# Patient Record
Sex: FEMALE | Race: WHITE | NOT HISPANIC OR LATINO | Employment: FULL TIME | URBAN - METROPOLITAN AREA
[De-identification: names, ages, dates, MRNs, and addresses within clinical notes are randomized per-mention and may not be internally consistent; named-entity substitution may affect disease eponyms.]

---

## 2018-01-07 ENCOUNTER — HOSPITAL ENCOUNTER (EMERGENCY)
Facility: OTHER | Age: 33
Discharge: HOME | End: 2018-01-07
Payer: MEDICAID

## 2018-01-07 VITALS
DIASTOLIC BLOOD PRESSURE: 81 MMHG | OXYGEN SATURATION: 100 % | RESPIRATION RATE: 18 BRPM | HEIGHT: 66 IN | TEMPERATURE: 98 F | BODY MASS INDEX: 25.71 KG/M2 | HEART RATE: 89 BPM | SYSTOLIC BLOOD PRESSURE: 135 MMHG | WEIGHT: 160 LBS

## 2018-01-07 DIAGNOSIS — Z48.02 VISIT FOR SUTURE REMOVAL: Primary | ICD-10-CM

## 2018-01-07 PROCEDURE — 99281 EMR DPT VST MAYX REQ PHY/QHP: CPT

## 2018-01-07 NOTE — ED PROVIDER NOTES
Encounter Date: 1/7/2018       History     Chief Complaint   Patient presents with    Suture / Staple Removal     suture removal to L arm x 14 day ago, pt states that she attempted to take out herself and that she is going to need to be numbed up to get the rest of the sutures removed     The history is provided by the patient.   Suture / Staple Removal    The sutures were placed 11 to 14 days ago. There has been no treatment since the wound repair. There has been no drainage from the wound. There is no redness present. There is no swelling present. The pain has improved. She has no difficulty moving the affected extremity or digit.     Review of patient's allergies indicates:   Allergen Reactions    Pcn [penicillins]      History reviewed. No pertinent past medical history.  Past Surgical History:   Procedure Laterality Date    TUBAL LIGATION       No family history on file.  Social History   Substance Use Topics    Smoking status: Never Smoker    Smokeless tobacco: Not on file    Alcohol use Yes     Review of Systems   Constitutional: Negative.    HENT: Negative.    Eyes: Negative.    Respiratory: Negative.    Cardiovascular: Negative.    Gastrointestinal: Negative.    Endocrine: Negative.    Genitourinary: Negative.    Musculoskeletal: Negative.    Skin: Negative.    Allergic/Immunologic: Negative.    Neurological: Negative.    Hematological: Negative.    Psychiatric/Behavioral: Negative.    All other systems reviewed and are negative.      Physical Exam     Initial Vitals [01/07/18 0023]   BP Pulse Resp Temp SpO2   130/80 90 18 98.2 °F (36.8 °C) 100 %      MAP       96.67         Physical Exam    Nursing note and vitals reviewed.  Constitutional: Vital signs are normal. She appears well-developed. She is active and cooperative.   HENT:   Head: Normocephalic and atraumatic.   Eyes: Conjunctivae, EOM and lids are normal. Pupils are equal, round, and reactive to light.   Neck: Trachea normal and full passive  range of motion without pain. Neck supple. No thyroid mass present.   Cardiovascular: Normal rate, regular rhythm, S1 normal, S2 normal, normal heart sounds, intact distal pulses and normal pulses.   Abdominal: Soft. Normal appearance, normal aorta and bowel sounds are normal.   Musculoskeletal: Normal range of motion.        Arms:  Lymphadenopathy:     She has no axillary adenopathy.   Neurological: She is alert and oriented to person, place, and time.   Skin: Skin is warm, dry and intact.   Psychiatric: She has a normal mood and affect. Her speech is normal and behavior is normal. Judgment and thought content normal. Cognition and memory are normal.         ED Course   Suture Removal  Date/Time: 1/7/2018 1:30 AM  Location procedure was performed: [facility] EMERGENCY DEPARTMENT  Performed by: SHAKILA TORRES.  Authorized by: SHAKILA TORRES   Pre-operative diagnosis: laceration  Post-operative diagnosis: laceration  Body area: upper extremity  Location details: left lower arm  Wound Appearance: clean and tender  Sutures Removed: 3  Post-removal: dressing applied  Complications: No  Specimens: No  Implants: No  Patient tolerance: Patient tolerated the procedure well with no immediate complications        Labs Reviewed - No data to display                            ED Course      Clinical Impression:   The encounter diagnosis was Visit for suture removal.                           Shakila Torres MD  01/07/18 0131

## 2018-01-07 NOTE — ED NOTES
"Pt presents to ED with 3 suture in place to L arm, pt states that the suture were place x 14 days ago a [facility] and that she had attempted to remove sutures herself but was unable to remove the remaining 3 due to pain, pt requested that we "numb" her are to remove sutures. No redness, bleeding or s/s of infection noted, wound edges well approximated, sutures removed from arm, pt states that she feels that the sutures were still in place, however, no suture were visualized at wound site, pt instructed to follow up with PCP for further evaluation.   "

## 2018-01-11 ENCOUNTER — HOSPITAL ENCOUNTER (EMERGENCY)
Facility: OTHER | Age: 33
Discharge: HOME | End: 2018-01-11
Attending: EMERGENCY MEDICINE
Payer: MEDICAID

## 2018-01-11 VITALS
BODY MASS INDEX: 25.71 KG/M2 | HEIGHT: 66 IN | OXYGEN SATURATION: 98 % | DIASTOLIC BLOOD PRESSURE: 85 MMHG | WEIGHT: 160 LBS | RESPIRATION RATE: 18 BRPM | SYSTOLIC BLOOD PRESSURE: 140 MMHG | HEART RATE: 100 BPM

## 2018-01-11 DIAGNOSIS — Z51.89 VISIT FOR WOUND CHECK: Primary | ICD-10-CM

## 2018-01-11 PROCEDURE — 99281 EMR DPT VST MAYX REQ PHY/QHP: CPT

## 2018-01-11 NOTE — ED PROVIDER NOTES
"Encounter Date: 1/11/2018       History     Chief Complaint   Patient presents with    Suture / Staple Removal     Pt. reports she was seen Saturday to have her sutures removed and she feels they still have 3 sutures left in her arm that are "deep."     Chief complaint: Wound check  32-year-old presents secondary to possible retained suture.  Patient had "19" stitches placed at [facility] on December 23.  She took out 16 of the stitches herself.  She came here on January 7 to hane  3 "anchoring" sutures removed.  Patient says that she thinks that part of one of the stitches is still in her arm.  She says that she can feel this stitch amd that the area is tight and looks different from the other sites of the anchoring suture removal.  Patient has occasional pain to the arm.  She said she was told that she cut through muscle and some nerve endings.      The history is provided by the patient, a significant other and medical records.     Review of patient's allergies indicates:   Allergen Reactions    Pcn [penicillins]      History reviewed. No pertinent past medical history.  Past Surgical History:   Procedure Laterality Date    laminectomy      TUBAL LIGATION       History reviewed. No pertinent family history.  Social History   Substance Use Topics    Smoking status: Former Smoker     Quit date: 4/11/2016    Smokeless tobacco: Never Used    Alcohol use Yes     Review of Systems   Constitutional: Negative for fever.   Skin: Positive for wound.   Neurological: Positive for numbness (occasional ). Negative for weakness.       Physical Exam     Initial Vitals [01/11/18 1219]   BP Pulse Resp Temp SpO2   (!) 140/85 100 16 -- 100 %      MAP       103.33         Physical Exam    Nursing note and vitals reviewed.  Constitutional: She appears well-nourished.   HENT:   Head: Atraumatic.   Musculoskeletal: Normal range of motion.   Neurological: She is alert and oriented to person, place, and time. She has " normal strength. No sensory deficit.   Skin: Skin is warm and dry.   See pictures, no warmth, erythema or purulent drainage from incision, non tender , no visible retained FB on exam              ED Course   Procedures  Labs Reviewed - No data to display          Medical Decision Making:   Initial Assessment:   32-year-old presents for evaluation of possible retained suture to her arm.  See attached pictures.  ED Management:  I cannot visualize any retained sutures.  However, the  patient feels the suture  underneath her skin.  I examined the wound closely and am unable to grasp/visualize any suture material.  I did explain to the patient that I thought it would be more harmful than good to explore the site since the skin is closed.  There is no evidence of infection.  Dr. Bosch was contacted (Gen. surgery) and he agrees to evaluate the patient in his office.  Patient understands that seeing the surgeon is the appropriate place for evaluating a soft tissue foreign body to avoid further damage to the site.                      pictures 1,2: prior to suture removal on 1/7  Picture 3: after sutures placed on 1/23            ED Course      Clinical Impression:   The encounter diagnosis was Visit for wound check.                           Augustina Lopez MD  01/11/18 4194

## 2018-01-11 NOTE — ED NOTES
Pt presents to ED with c/o suture removal. Pt reports sutures being placed at another facility and feeling like there is one suture left in her left forearm.

## 2020-08-01 ENCOUNTER — HOSPITAL ENCOUNTER (EMERGENCY)
Facility: HOSPITAL | Age: 35
Discharge: HOME | End: 2020-08-01
Attending: EMERGENCY MEDICINE
Payer: MEDICAID

## 2020-08-01 VITALS
HEART RATE: 75 BPM | HEIGHT: 62 IN | SYSTOLIC BLOOD PRESSURE: 141 MMHG | OXYGEN SATURATION: 100 % | BODY MASS INDEX: 28.52 KG/M2 | DIASTOLIC BLOOD PRESSURE: 89 MMHG | RESPIRATION RATE: 16 BRPM | WEIGHT: 155 LBS | TEMPERATURE: 98 F

## 2020-08-01 DIAGNOSIS — S05.01XA ABRASION OF RIGHT CORNEA, INITIAL ENCOUNTER: Primary | ICD-10-CM

## 2020-08-01 LAB
B-HCG UR QL: NEGATIVE
CTP QC/QA: YES

## 2020-08-01 PROCEDURE — 99284 EMERGENCY DEPT VISIT MOD MDM: CPT | Mod: 25,ER

## 2020-08-01 PROCEDURE — 90715 TDAP VACCINE 7 YRS/> IM: CPT | Mod: SL,ER | Performed by: EMERGENCY MEDICINE

## 2020-08-01 PROCEDURE — 90471 IMMUNIZATION ADMIN: CPT | Mod: VFC,ER | Performed by: EMERGENCY MEDICINE

## 2020-08-01 PROCEDURE — 81025 URINE PREGNANCY TEST: CPT | Mod: ER | Performed by: EMERGENCY MEDICINE

## 2020-08-01 PROCEDURE — 25000003 PHARM REV CODE 250: Mod: ER | Performed by: EMERGENCY MEDICINE

## 2020-08-01 PROCEDURE — 63600175 PHARM REV CODE 636 W HCPCS: Mod: SL,ER | Performed by: EMERGENCY MEDICINE

## 2020-08-01 RX ORDER — PROPARACAINE HYDROCHLORIDE 5 MG/ML
1 SOLUTION/ DROPS OPHTHALMIC
Status: COMPLETED | OUTPATIENT
Start: 2020-08-01 | End: 2020-08-01

## 2020-08-01 RX ORDER — ACETAMINOPHEN 500 MG
500 TABLET ORAL EVERY 6 HOURS PRN
Qty: 30 TABLET | Refills: 0 | Status: SHIPPED | OUTPATIENT
Start: 2020-08-01

## 2020-08-01 RX ORDER — IBUPROFEN 600 MG/1
600 TABLET ORAL EVERY 6 HOURS PRN
Qty: 20 TABLET | Refills: 0 | Status: SHIPPED | OUTPATIENT
Start: 2020-08-01

## 2020-08-01 RX ORDER — OXYCODONE AND ACETAMINOPHEN 5; 325 MG/1; MG/1
1 TABLET ORAL EVERY 8 HOURS PRN
Qty: 6 TABLET | Refills: 0 | Status: SHIPPED | OUTPATIENT
Start: 2020-08-01

## 2020-08-01 RX ORDER — MOXIFLOXACIN 5 MG/ML
1 SOLUTION/ DROPS OPHTHALMIC 3 TIMES DAILY
Qty: 3 ML | Refills: 0 | Status: SHIPPED | OUTPATIENT
Start: 2020-08-01 | End: 2020-08-08

## 2020-08-01 RX ORDER — MOXIFLOXACIN 5 MG/ML
1 SOLUTION/ DROPS OPHTHALMIC
Status: COMPLETED | OUTPATIENT
Start: 2020-08-01 | End: 2020-08-01

## 2020-08-01 RX ORDER — ONDANSETRON 8 MG/1
8 TABLET, ORALLY DISINTEGRATING ORAL EVERY 6 HOURS PRN
Qty: 10 TABLET | Refills: 0 | Status: SHIPPED | OUTPATIENT
Start: 2020-08-01 | End: 2020-08-03

## 2020-08-01 RX ADMIN — FLUORESCEIN SODIUM 1 EACH: 1 STRIP OPHTHALMIC at 10:08

## 2020-08-01 RX ADMIN — CLOSTRIDIUM TETANI TOXOID ANTIGEN (FORMALDEHYDE INACTIVATED), CORYNEBACTERIUM DIPHTHERIAE TOXOID ANTIGEN (FORMALDEHYDE INACTIVATED), BORDETELLA PERTUSSIS TOXOID ANTIGEN (GLUTARALDEHYDE INACTIVATED), BORDETELLA PERTUSSIS FILAMENTOUS HEMAGGLUTININ ANTIGEN (FORMALDEHYDE INACTIVATED), BORDETELLA PERTUSSIS PERTACTIN ANTIGEN, AND BORDETELLA PERTUSSIS FIMBRIAE 2/3 ANTIGEN 0.5 ML: 5; 2; 2.5; 5; 3; 5 INJECTION, SUSPENSION INTRAMUSCULAR at 11:08

## 2020-08-01 RX ADMIN — PROPARACAINE HYDROCHLORIDE 1 DROP: 5 SOLUTION/ DROPS OPHTHALMIC at 10:08

## 2020-08-01 RX ADMIN — MOXIFLOXACIN 1 DROP: 5 SOLUTION/ DROPS OPHTHALMIC at 11:08

## 2020-08-01 NOTE — ED PROVIDER NOTES
Encounter Date: 2020    SCRIBE #1 NOTE: I, Jeanine Bueno, am scribing for, and in the presence of,  Dr. House. I have scribed the following portions of the note - Other sections scribed: HPI, ROS, PE.       History     Chief Complaint   Patient presents with    Eye Problem     R Eye pain since yesterday afternoon, states diffifult to open her eye. Wears contacts, they are removed.     Elise Coronel is a 35 y.o. female who presents to the ED complaining of right eye pain since yesterday afternoon. Patient reports that the pain worsened after she took out her contacts later that night. Denies injury to eye, or any known foreign body. Patient unsure if tetanus is UTD.    The history is provided by the patient. No  was used.     Review of patient's allergies indicates:   Allergen Reactions    Pcn [penicillins]      No past medical history on file.  Past Surgical History:   Procedure Laterality Date    laminectomy      TUBAL LIGATION       No family history on file.  Social History     Tobacco Use    Smoking status: Former Smoker     Quit date: 2016     Years since quittin.3    Smokeless tobacco: Never Used   Substance Use Topics    Alcohol use: Yes    Drug use: No     Review of Systems   Eyes: Positive for pain.   All other systems reviewed and are negative.      Physical Exam     Initial Vitals [20 0906]   BP Pulse Resp Temp SpO2   (!) 141/93 85 16 98.2 °F (36.8 °C) 98 %      MAP       --         Physical Exam    Nursing note and vitals reviewed.  Constitutional: She appears well-developed and well-nourished.   HENT:   Head: Normocephalic and atraumatic.   Right Ear: External ear normal.   Left Ear: External ear normal.   Nose: Nose normal.   Mouth/Throat: Oropharynx is clear and moist.   Eyes: Conjunctivae and EOM are normal. Pupils are equal, round, and reactive to light.   Slit lamp exam:       The right eye shows corneal abrasion.   Right eye corneal abrasion, 4 o'clock to  7 o'clock.   Neck: Normal range of motion and phonation normal. Neck supple.   Cardiovascular: Normal rate, regular rhythm, normal heart sounds and intact distal pulses. Exam reveals no gallop and no friction rub.    No murmur heard.  Pulmonary/Chest: Effort normal and breath sounds normal. No stridor. No respiratory distress. She has no wheezes. She has no rhonchi. She has no rales. She exhibits no tenderness.   Abdominal: Soft. Bowel sounds are normal. She exhibits no distension. There is no abdominal tenderness. There is no rigidity, no rebound and no guarding.   Musculoskeletal: Normal range of motion. No tenderness or edema.   Neurological: She is alert and oriented to person, place, and time. She has normal strength. No cranial nerve deficit or sensory deficit. GCS score is 15. GCS eye subscore is 4. GCS verbal subscore is 5. GCS motor subscore is 6.   Skin: Skin is warm and dry. Capillary refill takes less than 2 seconds. No rash noted.   Psychiatric: She has a normal mood and affect. Her behavior is normal.         ED Course   Procedures  Labs Reviewed   POCT URINE PREGNANCY               Medical Decision Making:   History:   Old Medical Records: I decided to obtain old medical records.  Clinical Tests:   Lab Tests: Ordered and Reviewed    Chief complaint: eye pain  Differential diagnosis: left eye pain, conjunctivitis, corneal abrasion, allergic conjunctivitis, pregnancy    Treatment in the ED: PE, Tdap vaccine injection 0.5 mL, moxifloxacin 0.5 % ophthalmic solution 1 drop, proparacaine 0.5 % ophthalmic solution 1 drop, fluorescein ophthalmic strip 1 each  Patient reports feeling better after treatment in the ER.       Discussed treatment, prescriptions, labs, and imaging results.    Discharge home with    ondansetron (ZOFRAN-ODT) 8 MG TbDL     oxyCODONE-acetaminophen (PERCOCET) 5-325 mg per tablet     moxifloxacin (VIGAMOX) 0.5 % ophthalmic solution     acetaminophen (TYLENOL) 500 MG tablet     ibuprofen  (ADVIL,MOTRIN) 600 MG tablet       Fill and take prescriptions as directed.  Return to the ED if symptoms worsen or do not resolve.   Answered questions and discussed discharge plan.    Patient feels better and is ready for discharge.  Follow up with PCP/specialist in 1 day.            Scribe Attestation:   Scribe #1: I performed the above scribed service and the documentation accurately describes the services I performed. I attest to the accuracy of the note.     I, Dr. Tamica House, personally performed the services described in this documentation. This document was produced by a scribe under my direction and in my presence. All medical record entries made by the scribe were at my direction and in my presence.  I have reviewed the chart and agree that the record reflects my personal performance and is accurate and complete. Tamica House, DO.     08/02/2020 8:24 AM                        Clinical Impression:     1. Abrasion of right cornea, initial encounter                ED Disposition Condition    Discharge Stable        ED Prescriptions     Medication Sig Dispense Start Date End Date Auth. Provider    ibuprofen (ADVIL,MOTRIN) 600 MG tablet Take 1 tablet (600 mg total) by mouth every 6 (six) hours as needed for Pain (Take with food as needed for mild-to-moderate pain). 20 tablet 8/1/2020  Tamica House DO    acetaminophen (TYLENOL) 500 MG tablet Take 1 tablet (500 mg total) by mouth every 6 (six) hours as needed for Pain. 30 tablet 8/1/2020  Tamica House, DO    moxifloxacin (VIGAMOX) 0.5 % ophthalmic solution Place 1 drop into both eyes 3 (three) times daily. for 7 days 3 mL 8/1/2020 8/8/2020 Tamcia House, DO    oxyCODONE-acetaminophen (PERCOCET) 5-325 mg per tablet Take 1 tablet by mouth every 8 (eight) hours as needed for Pain (As if her severe 10 out 10 pain). 6 tablet 8/1/2020  Tamica House, DO    ondansetron (ZOFRAN-ODT) 8 MG TbDL Take 1 tablet (8 mg total) by mouth every 6 (six) hours as needed (As  needed for nausea). 10 tablet 8/1/2020 8/3/2020 Tamica House DO        Follow-up Information     Follow up With Specialties Details Why Contact Info    Oziel Alejandro MD Ophthalmology Schedule an appointment as soon as possible for a visit in 1 day Follow-up for further evaluation of her right eye pain and corneal abrasion [address] [facility]  [place] [address]  [phone]      [facility] Emergency Medicine  Go to [facility] emergency department on [facility] if symptoms worsen or do not resolve [address] [place] [address]  [phone]                                     Tamica House DO  08/02/20 0824

## 2020-08-01 NOTE — Clinical Note
Elise Coronel was seen and treated in our emergency department on 8/1/2020.  She may return to work on 08/04/2020.       If you have any questions or concerns, please don't hesitate to call.      Tamica House, DO

## 2020-08-03 ENCOUNTER — TELEPHONE (OUTPATIENT)
Dept: OPHTHALMOLOGY | Facility: CLINIC | Age: 35
End: 2020-08-03

## 2020-08-03 ENCOUNTER — OFFICE VISIT (OUTPATIENT)
Dept: OPHTHALMOLOGY | Facility: CLINIC | Age: 35
End: 2020-08-03
Payer: MEDICAID

## 2020-08-03 DIAGNOSIS — H20.9 IRITIS: Primary | ICD-10-CM

## 2020-08-03 DIAGNOSIS — H52.7 REFRACTIVE ERROR: ICD-10-CM

## 2020-08-03 PROCEDURE — 99999 PR PBB SHADOW E&M-EST. PATIENT-LVL II: CPT | Mod: PBBFAC,,, | Performed by: OPHTHALMOLOGY

## 2020-08-03 PROCEDURE — 92004 COMPRE OPH EXAM NEW PT 1/>: CPT | Mod: S$PBB,,, | Performed by: OPHTHALMOLOGY

## 2020-08-03 PROCEDURE — 99999 PR PBB SHADOW E&M-EST. PATIENT-LVL II: ICD-10-PCS | Mod: PBBFAC,,, | Performed by: OPHTHALMOLOGY

## 2020-08-03 PROCEDURE — 99212 OFFICE O/P EST SF 10 MIN: CPT | Mod: PBBFAC,PO | Performed by: OPHTHALMOLOGY

## 2020-08-03 PROCEDURE — 92004 PR EYE EXAM, NEW PATIENT,COMPREHESV: ICD-10-PCS | Mod: S$PBB,,, | Performed by: OPHTHALMOLOGY

## 2020-08-03 NOTE — PROGRESS NOTES
"Subjective:       Patient ID: Elise Coronel is a 35 y.o. female.    Chief Complaint: Concerns About Ocular Health    HPI     Referred: ED    34 y/o female is here for ED F/U. Pt present with a c/o of unresolved   Corneal Abrasion of the RT eye. Pt reports she went to the ED on 8/1/2020   for eye pain after removing C/L. Pt changes C/L once a month, she "think".   Pt was discharge with Viganox. Pt today is c/o of pain, blurry vision, and   light sensitivity.     Eyemeds  Vigamox TID OD    Last edited by Rut Shea on 8/3/2020  1:54 PM. (History)             Assessment:       1. Iritis    2. Refractive error        Plan:       Iritis OD-Pt was seen in ED & dx'd with K abrasion & started on Vigamox tid. No evidence of abrasion or ulcer. Pt does have mild iritis OD.  RE-Hold SCL wear OD for now.    D/c Vigamox OD.  Start PF qid OD x 1 wk, then tid.  RTC 8/13/2020.     "

## 2020-08-13 ENCOUNTER — OFFICE VISIT (OUTPATIENT)
Dept: OPHTHALMOLOGY | Facility: CLINIC | Age: 35
End: 2020-08-13
Payer: MEDICAID

## 2020-08-13 DIAGNOSIS — H20.9 IRITIS: Primary | ICD-10-CM

## 2020-08-13 PROCEDURE — 99212 OFFICE O/P EST SF 10 MIN: CPT | Mod: PBBFAC,PO | Performed by: OPHTHALMOLOGY

## 2020-08-13 PROCEDURE — 92012 INTRM OPH EXAM EST PATIENT: CPT | Mod: S$PBB,,, | Performed by: OPHTHALMOLOGY

## 2020-08-13 PROCEDURE — 92012 PR EYE EXAM, EST PATIENT,INTERMED: ICD-10-PCS | Mod: S$PBB,,, | Performed by: OPHTHALMOLOGY

## 2020-08-13 PROCEDURE — 99999 PR PBB SHADOW E&M-EST. PATIENT-LVL II: ICD-10-PCS | Mod: PBBFAC,,, | Performed by: OPHTHALMOLOGY

## 2020-08-13 PROCEDURE — 99999 PR PBB SHADOW E&M-EST. PATIENT-LVL II: CPT | Mod: PBBFAC,,, | Performed by: OPHTHALMOLOGY

## 2020-08-13 NOTE — PROGRESS NOTES
Subjective:       Patient ID: Elise Coronel is a 35 y.o. female.    Chief Complaint: Concerns About Ocular Health    HPI     DLS: 8/3/2020  Pt states OD feels much better. Denies va changes and photophobia.    PF OD QID     Last edited by Neeru Gallagher on 8/13/2020  1:34 PM. (History)             Assessment:       1. Iritis        Plan:       Iritis OD-Resolved.    Taper off PF OD over 3 wks.  RTC prn.

## 2020-08-27 ENCOUNTER — HOSPITAL ENCOUNTER (EMERGENCY)
Facility: HOSPITAL | Age: 35
Discharge: HOME | End: 2020-08-27
Attending: EMERGENCY MEDICINE
Payer: MEDICAID

## 2020-08-27 VITALS
WEIGHT: 155 LBS | RESPIRATION RATE: 16 BRPM | OXYGEN SATURATION: 100 % | BODY MASS INDEX: 24.91 KG/M2 | TEMPERATURE: 98 F | SYSTOLIC BLOOD PRESSURE: 141 MMHG | DIASTOLIC BLOOD PRESSURE: 78 MMHG | HEART RATE: 84 BPM | HEIGHT: 66 IN

## 2020-08-27 DIAGNOSIS — S16.1XXA STRAIN OF NECK MUSCLE, INITIAL ENCOUNTER: Primary | ICD-10-CM

## 2020-08-27 PROCEDURE — 63600175 PHARM REV CODE 636 W HCPCS: Mod: ER | Performed by: PHYSICIAN ASSISTANT

## 2020-08-27 PROCEDURE — 99284 EMERGENCY DEPT VISIT MOD MDM: CPT | Mod: 25,ER

## 2020-08-27 PROCEDURE — 96372 THER/PROPH/DIAG INJ SC/IM: CPT | Mod: 59,ER

## 2020-08-27 RX ORDER — DEXAMETHASONE SODIUM PHOSPHATE 4 MG/ML
8 INJECTION, SOLUTION INTRA-ARTICULAR; INTRALESIONAL; INTRAMUSCULAR; INTRAVENOUS; SOFT TISSUE
Status: COMPLETED | OUTPATIENT
Start: 2020-08-27 | End: 2020-08-27

## 2020-08-27 RX ORDER — TIZANIDINE 2 MG/1
4 TABLET ORAL EVERY 6 HOURS PRN
COMMUNITY

## 2020-08-27 RX ORDER — MORPHINE SULFATE 4 MG/ML
4 INJECTION, SOLUTION INTRAMUSCULAR; INTRAVENOUS
Status: COMPLETED | OUTPATIENT
Start: 2020-08-27 | End: 2020-08-27

## 2020-08-27 RX ORDER — ORPHENADRINE CITRATE 30 MG/ML
60 INJECTION INTRAMUSCULAR; INTRAVENOUS
Status: COMPLETED | OUTPATIENT
Start: 2020-08-27 | End: 2020-08-27

## 2020-08-27 RX ADMIN — ORPHENADRINE CITRATE 60 MG: 30 INJECTION INTRAMUSCULAR; INTRAVENOUS at 11:08

## 2020-08-27 RX ADMIN — MORPHINE SULFATE 4 MG: 4 INJECTION INTRAVENOUS at 12:08

## 2020-08-27 RX ADMIN — DEXAMETHASONE SODIUM PHOSPHATE 8 MG: 4 INJECTION, SOLUTION INTRAMUSCULAR; INTRAVENOUS at 11:08

## 2020-08-27 NOTE — ED NOTES
35 y.o. female to ED with c.o. neck/ shoulder pain with numbness and tingling down right arm. Patient states she has had this happen before and had surgery for it several years ago. Patient states she called her surgeon but the soonest he can see her is late September and the office referred her here. Patient denies headache/ blurred vision/ weakness/ dizziness. Patient states it feels like a flare up but it is worse than normal. Patient states she went crabbing with her family yesterday and believes this is what caused the flare up. Patient denies all other medical complaints at this time.

## 2020-08-27 NOTE — ED PROVIDER NOTES
Encounter Date: 2020    SCRIBE #1 NOTE: I, Justin Flores, am scribing for, and in the presence of,  LUAN Reaves. I have scribed the following portions of the note - Other sections scribed: HPI, ROS, PE.       History     Chief Complaint   Patient presents with    Neck Pain     reports upper back and neck pain since the weekend. hx of neck surgery. took ibuprofen, BC powder, and gabapentin without relief.      Elise Coronel is a 35 y.o. female who presents to the ED complaining of painful knot to right upper back that started 4 days ago. Patient states the pain is a shooting pain that intermittently radiates to the back of the right arm to the forearm with certain positions. Patient states she went crabbing last weekend and the motor went out and had to row. Patient has surgical history that includes cervical Laminectomy. Patient has and appointment with her orthopedics surgeon, Dr. Sylvester Jon next month. Denies fever, weight loss, or arm weakness or numbness. She has been taking BC powder, gabapentin, and ibuprofen for pain without relief. Has a tizanidine prescription that she will fill today.    The history is provided by the patient. No  was used.     Review of patient's allergies indicates:   Allergen Reactions    Pcn [penicillins]      History reviewed. No pertinent past medical history.  Past Surgical History:   Procedure Laterality Date    laminectomy      TUBAL LIGATION       No family history on file.  Social History     Tobacco Use    Smoking status: Former Smoker     Quit date: 2016     Years since quittin.3    Smokeless tobacco: Never Used   Substance Use Topics    Alcohol use: Yes    Drug use: No     Review of Systems   Constitutional: Negative for chills, fever and unexpected weight change.   Musculoskeletal: Positive for back pain (right upper) and myalgias (right upper extremity).   Neurological: Negative for weakness and numbness.   All other systems  reviewed and are negative.      Physical Exam     Initial Vitals [08/27/20 0959]   BP Pulse Resp Temp SpO2   129/89 82 17 99 °F (37.2 °C) 99 %      MAP       --         Physical Exam    Nursing note and vitals reviewed.  Constitutional: She appears well-developed and well-nourished.   HENT:   Head: Normocephalic and atraumatic.   Eyes: Conjunctivae are normal.   Neck: Normal range of motion. Neck supple.   Cardiovascular: Normal rate, regular rhythm and intact distal pulses.   Pulses:       Radial pulses are 2+ on the right side and 2+ on the left side.   Pulmonary/Chest: Effort normal. No respiratory distress.   Musculoskeletal: Normal range of motion.   Neurological: She is alert and oriented to person, place, and time. She has normal strength. GCS eye subscore is 4. GCS verbal subscore is 5. GCS motor subscore is 6.   Skin: Skin is warm and dry. Capillary refill takes less than 2 seconds.   Psychiatric: She has a normal mood and affect.         ED Course   Procedures  Labs Reviewed - No data to display       Imaging Results    None          Medical Decision Making:   History:   Old Medical Records: I decided to obtain old medical records.  ED Management:  35-year-old female with history of cervical musculoskeletal pain, history of cervical laminectomy, with recent worsening after physical activity over the weekend.  Denies fever, numbness, or weakness.  Will treat with muscle relaxer, NSAID, Decadron.  Patient with no relief initially, given morphine.  Will hold NSAID, as patient had multiple NSAIDs recently this morning.            Scribe Attestation:   Scribe #1: I performed the above scribed service and the documentation accurately describes the services I performed. I attest to the accuracy of the note.     I, Jabari Hernandez, personally performed the services described in this documentation. All medical record entries made by the scribe were at my direction and in my presence.  I have reviewed the chart and agree  that the record reflects my personal performance and is accurate and complete                        Clinical Impression:     1. Strain of neck muscle, initial encounter                ED Disposition Condition    Discharge Stable        ED Prescriptions     None        Follow-up Information     Follow up With Specialties Details Why Contact Info    Spine surgeon  Go to  For follow-up care, As already scheduled     [facility] Emergency Medicine Go to  If symptoms worsen [address] [facility] [address]  [phone]    Hardeep Watts Jr., MD Family Medicine   [address] [place] [address]  [phone]                                       Jabari Hernandez PA-C  08/27/20 1159       Jabari Hernandez PA-C  08/27/20 9485

## 2020-08-27 NOTE — FIRST PROVIDER EVALUATION
Emergency Department TeleTRIAGE Encounter Note      CHIEF COMPLAINT    Chief Complaint   Patient presents with    Neck Pain     reports upper back and neck pain since the weekend. hx of neck surgery. took ibuprofen, BC powder, and gabapentin without relief.        VITAL SIGNS   Initial Vitals [08/27/20 0959]   BP Pulse Resp Temp SpO2   129/89 82 17 99 °F (37.2 °C) 99 %      MAP       --            ALLERGIES    Review of patient's allergies indicates:   Allergen Reactions    Pcn [penicillins]        PROVIDER TRIAGE NOTE  Pt is a 35 yr old female with hx of prior neck surgery who presents to the ED with neck pain.  States pain is radiating into right arm.  Denies associated chest pain or shortness of breath.  Denies extremity weakness.  Says her surgeon cannot see her until mid September and the pain meds she has at home are not treating the pain.  Will defer pain management to the evaluating provider.     ORDERS  Labs Reviewed - No data to display    ED Orders (720h ago, onward)    None            Virtual Visit Note: The provider triage portion of this emergency department evaluation and documentation was performed via [employer], a HIPAA-compliant telemedicine application, in concert with a tele-presenter in the room. A face to face patient evaluation with one of my colleagues will occur once the patient is placed in an emergency department room.      DISCLAIMER: This note was prepared with [employer] voice recognition transcription software. Garbled syntax, mangled pronouns, and other bizarre constructions may be attributed to that software system.

## 2020-08-27 NOTE — ED NOTES
"Patient still refusing discharge. Patient states, "I would not be here if I could tolerate the pain" patient states the morphine did not touch the pain. LUAN Hernandez notified.  "

## 2021-04-15 ENCOUNTER — PATIENT MESSAGE (OUTPATIENT)
Dept: RESEARCH | Facility: HOSPITAL | Age: 36
End: 2021-04-15

## 2024-07-17 ENCOUNTER — HOSPITAL ENCOUNTER (EMERGENCY)
Facility: HOSPITAL | Age: 39
Discharge: HOME | End: 2024-07-17
Attending: EMERGENCY MEDICINE
Payer: MEDICAID

## 2024-07-17 VITALS
DIASTOLIC BLOOD PRESSURE: 71 MMHG | HEIGHT: 64 IN | WEIGHT: 135 LBS | SYSTOLIC BLOOD PRESSURE: 122 MMHG | BODY MASS INDEX: 23.05 KG/M2 | OXYGEN SATURATION: 100 % | HEART RATE: 67 BPM | RESPIRATION RATE: 18 BRPM | TEMPERATURE: 98 F

## 2024-07-17 DIAGNOSIS — M54.2 NECK PAIN: ICD-10-CM

## 2024-07-17 DIAGNOSIS — R07.89 CHEST WALL PAIN: ICD-10-CM

## 2024-07-17 DIAGNOSIS — R07.81 RIB PAIN ON LEFT SIDE: ICD-10-CM

## 2024-07-17 LAB
B-HCG UR QL: NEGATIVE
CTP QC/QA: YES

## 2024-07-17 PROCEDURE — 93005 ELECTROCARDIOGRAM TRACING: CPT | Mod: ER

## 2024-07-17 PROCEDURE — 93010 ELECTROCARDIOGRAM REPORT: CPT | Mod: ,,, | Performed by: INTERNAL MEDICINE

## 2024-07-17 PROCEDURE — 81025 URINE PREGNANCY TEST: CPT | Mod: ER

## 2024-07-17 PROCEDURE — 25000003 PHARM REV CODE 250: Mod: ER

## 2024-07-17 PROCEDURE — 99900035 HC TECH TIME PER 15 MIN (STAT): Mod: ER

## 2024-07-17 PROCEDURE — 99284 EMERGENCY DEPT VISIT MOD MDM: CPT | Mod: 25,ER

## 2024-07-17 RX ORDER — NAPROXEN 250 MG/1
500 TABLET ORAL
Status: COMPLETED | OUTPATIENT
Start: 2024-07-17 | End: 2024-07-17

## 2024-07-17 RX ORDER — METHOCARBAMOL 750 MG/1
1500 TABLET, FILM COATED ORAL
Status: COMPLETED | OUTPATIENT
Start: 2024-07-17 | End: 2024-07-17

## 2024-07-17 RX ORDER — LIDOCAINE 50 MG/G
3 PATCH TOPICAL
Status: DISCONTINUED | OUTPATIENT
Start: 2024-07-17 | End: 2024-07-17 | Stop reason: HOSPADM

## 2024-07-17 RX ORDER — METHOCARBAMOL 500 MG/1
500 TABLET, FILM COATED ORAL 4 TIMES DAILY
Qty: 20 TABLET | Refills: 0 | Status: SHIPPED | OUTPATIENT
Start: 2024-07-17 | End: 2024-07-22

## 2024-07-17 RX ORDER — NAPROXEN 500 MG/1
500 TABLET ORAL 2 TIMES DAILY
Qty: 10 TABLET | Refills: 0 | Status: SHIPPED | OUTPATIENT
Start: 2024-07-17 | End: 2024-07-22

## 2024-07-17 RX ORDER — LIDOCAINE 50 MG/G
1 PATCH TOPICAL DAILY
Qty: 15 PATCH | Refills: 0 | Status: SHIPPED | OUTPATIENT
Start: 2024-07-17

## 2024-07-17 RX ADMIN — NAPROXEN 500 MG: 250 TABLET ORAL at 05:07

## 2024-07-17 RX ADMIN — METHOCARBAMOL 1500 MG: 750 TABLET ORAL at 05:07

## 2024-07-17 RX ADMIN — LIDOCAINE 3 PATCH: 700 PATCH TOPICAL at 05:07

## 2024-07-17 NOTE — ED PROVIDER NOTES
Encounter Date: 2024    SCRIBE #1 NOTE: I, Chengumaro Alston, am scribing for, and in the presence of,  Shay Sims PA-C.       History     Chief Complaint   Patient presents with    Rib Injury     Fell mountain biking 9 days ago. C/o right neck pain, left chest wall and left rib pain.      Patient is a 39 y.o. female with a past medical history of depression who presents to the Emergency Department for evaluation of left rib pain x 1 week after several falls while mountain biking about 1.5 weeks ago.  Reports pain is with any position changes, deep breathing.  Also reports right upper back pain.  She has not taken any medications for the symptoms.   She denies fever, chills, dark colored urine, hematuria, syncope, body aches or numbness. Endorses compliance with antidepressants only.       The history is provided by the patient. No  was used.     Review of patient's allergies indicates:   Allergen Reactions    Pcn [penicillins]      History reviewed. No pertinent past medical history.  Past Surgical History:   Procedure Laterality Date    laminectomy      TUBAL LIGATION       No family history on file.  Social History     Tobacco Use    Smoking status: Former     Current packs/day: 0.00     Types: Cigarettes     Quit date: 2016     Years since quittin.2    Smokeless tobacco: Never   Substance Use Topics    Alcohol use: Yes    Drug use: No     Review of Systems   Constitutional:  Negative for chills and fever.   Cardiovascular:  Positive for chest pain. Negative for palpitations and leg swelling.   Gastrointestinal:  Negative for abdominal pain, nausea and vomiting.   Genitourinary:  Negative for dysuria, flank pain, frequency, hematuria, vaginal bleeding and vaginal discharge.        (-) dark colored urine   Musculoskeletal:  Positive for back pain. Negative for neck pain and neck stiffness.        (+) left rib pain   (+) right upper back pain   (-) generalized body aches    Neurological:  Negative for dizziness, syncope, light-headedness, numbness and headaches.       Physical Exam     Initial Vitals [07/17/24 1622]   BP Pulse Resp Temp SpO2   (!) 130/93 93 18 97.8 °F (36.6 °C) 98 %      MAP       --         Physical Exam    Nursing note and vitals reviewed.  Constitutional: She appears well-developed and well-nourished.   HENT:   Head: Normocephalic and atraumatic.   Right Ear: External ear normal.   Left Ear: External ear normal.   Neck: Carotid bruit is not present.   Normal range of motion.  Cardiovascular:  Normal rate, regular rhythm, normal heart sounds and intact distal pulses.     Exam reveals no gallop and no friction rub.       No murmur heard.  Pulmonary/Chest: Breath sounds normal. No respiratory distress. She has no wheezes. She has no rhonchi. She has no rales.   Abdominal: Abdomen is soft. Bowel sounds are normal. She exhibits no distension. There is no abdominal tenderness. There is no rebound and no guarding.   Musculoskeletal:         General: Normal range of motion.      Cervical back: Normal range of motion.      Comments: There is normal range of motion of neck. Tenderness to right trapezius and over left ribs.  Left rib pain with range of motion of left shoulder.      Neurological: She is alert and oriented to person, place, and time. GCS score is 15. GCS eye subscore is 4. GCS verbal subscore is 5. GCS motor subscore is 6.   Psychiatric: She has a normal mood and affect.         ED Course   Procedures  Labs Reviewed   POCT URINE PREGNANCY          Imaging Results              X-Ray Ribs 2 View Left (Final result)  Result time 07/17/24 18:26:36      Final result by Cait Melara MD (07/17/24 18:26:36)                   Impression:      No displaced left rib fractures.      Electronically signed by: Cait Melara  Date:    07/17/2024  Time:    18:26               Narrative:    EXAMINATION:  LEFT RIBS    CLINICAL HISTORY:  Left rib  pain.    TECHNIQUE:  Two views of the left ribs    COMPARISON:  None    FINDINGS:  Three views of the left ribs demonstrate no displaced fractures.                                       X-Ray Chest PA And Lateral (Final result)  Result time 07/17/24 18:25:41      Final result by Cait Melara MD (07/17/24 18:25:41)                   Impression:      No acute intrathoracic abnormality.      Electronically signed by: Cait Melara  Date:    07/17/2024  Time:    18:25               Narrative:    EXAMINATION:  CHEST PA AND LATERAL    CLINICAL HISTORY:  Pleurodynia    TECHNIQUE:  PA and lateral chest radiograph    COMPARISON:  <None.>    FINDINGS:  The cardiac silhouette is within normal limits.   There is no focal consolidation, pneumothorax, or pleural effusion.  A nipple shadow is seen in the left lower lung field.                                       X-Ray Cervical Spine AP And Lateral (Final result)  Result time 07/17/24 18:27:45      Final result by Cait Melara MD (07/17/24 18:27:45)                   Impression:      No definite evidence of acute fracture.  Minimal degenerative change at C6 and C7.      Electronically signed by: Cait Melara  Date:    07/17/2024  Time:    18:27               Narrative:    EXAMINATION:  CERVICAL SPINE    CLINICAL HISTORY:  Pain.    TECHNIQUE:  AP, open-mouth, and lateral views of the cervical spine submitted    COMPARISON:  None.    FINDINGS:  There is satisfactory alignment of the cervical spine.  There is no acute fracture or subluxation detected. There is no definite evidence of prevertebral soft tissue swelling. The predental space is maintained.  Tiny marginal osteophytes are seen at C6 and C7.                                       Medications   LIDOcaine 5 % patch 3 patch (3 patches Transdermal Patch Applied 7/17/24 1714)   naproxen tablet 500 mg (500 mg Oral Given 7/17/24 1714)   methocarbamoL tablet 1,500 mg (1,500 mg Oral Given 7/17/24 1714)      Medical Decision Making  This is an emergent evaluation of a 39-year-old female who presents to the emergency department for evaluation of left chest/rib pain, right upper back pain after mountain bike riding 1.5 weeks ago with mechanical fall.    Patient looks well clinically. There is normal range of motion of neck. Tenderness to right trapezius and over left ribs.  Left rib pain with range of motion of left shoulder.  Regular rate rhythm without murmurs.  No carotid bruits appreciated on exam. Lungs are clear to auscultation bilaterally.  Abdomen is soft, nontender, non distended, with normal bowel sounds.     Differential diagnosis includes but is not limited to fracture, dislocation, muscle strain, pneumothorax.    Workup initiated with EKG, chest x-ray, x-ray of left ribs, x-ray of cervical spine.  Ordered naproxen, Robaxin, Lidoderm patches Vital signs, chart, labs, and/or imaging were all reviewed.  See ED course below and interpretations above. My overall impression is muscle strain. Will discharge home with naproxen, Robaxin, Lidoderm patches with PCP follow-up. Patient is very well appearing, and in no acute distress. Vital signs are reassuring here in the emergency department, patient is afebrile, breathing comfortable, satting 98 % on room air. Patient/Caregiver is stable for discharge at this time.  Patient/Caregiver was informed of results and plan of care. Patient/Caregiver verbalized understanding of care plan. All questions and concerns were addressed. Discussed strict return precautions with the patient/caregiver. Instructed follow up with primary care provider within 1 week.      Shay Sims PA-C    DISCLAIMER: This note was prepared with [employer] voice recognition transcription software. Garbled syntax, mangled pronouns, and other bizarre constructions may be attributed to that software system.       Amount and/or Complexity of Data Reviewed  Labs: ordered. Decision-making details  documented in ED Course.  Radiology: ordered. Decision-making details documented in ED Course.    Risk  Prescription drug management.            Scribe Attestation:   Scribe #1: I performed the above scribed service and the documentation accurately describes the services I performed. I attest to the accuracy of the note.        ED Course as of 07/17/24 1853 Wed Jul 17, 2024 1815 POCT urine pregnancy  UPT negative. [TM]   1833 X-Ray Cervical Spine AP And Lateral  No definite evidence of acute fracture.  Minimal degenerative change at C6 and C7. [TM]   1833 X-Ray Ribs 2 View Left    No displaced left rib fractures.    [TM]   1833 X-Ray Chest PA And Lateral  The cardiac silhouette is within normal limits.   There is no focal consolidation, pneumothorax, or pleural effusion.  A nipple shadow is seen in the left lower lung field. [TM]   1834 EKG shows normal sinus rhythm, ventricular rate of 61, normal QTC, no acute infarction. [TM]   1834 Informed patient of results.  Suspect muscle strain.  Will discharge home with naproxen, Robaxin, Lidoderm patches with PCP follow-up. [TM]      ED Course User Index  [TM] Shay Sims PA-C I, Trevor Marler PA-C, personally performed the services described in this documentation.  All medical record entries made by the scribe were at my direction and in my presence.  I have reviewed the chart and agree that the record reflects my personal performance and is accurate and complete.      Clinical Impression:  Final diagnoses:  [R07.81] Rib pain on left side  [M54.2] Neck pain  [R07.89] Chest wall pain          ED Disposition Condition    Discharge Stable          ED Prescriptions       Medication Sig Dispense Start Date End Date Auth. Provider    naproxen (NAPROSYN) 500 MG tablet Take 1 tablet (500 mg total) by mouth 2 (two) times daily. for 5 days 10 tablet 7/17/2024 7/22/2024 Shay Sims PA-C    methocarbamoL (ROBAXIN) 500 MG Tab Take 1 tablet (500 mg  total) by mouth 4 (four) times daily. for 5 days 20 tablet 7/17/2024 7/22/2024 Shay Sims PA-C    LIDOcaine (LIDODERM) 5 % Place 1 patch onto the skin once daily. Remove & Discard patch within 12 hours or as directed by MD 15 patch 7/17/2024 -- Shay Sims PA-C          Follow-up Information       Follow up With Specialties Details Why Contact [facility] ED Emergency Medicine Go to  As needed, If symptoms worsen, or new symptoms develop [address] [facility] [address] [phone]    Primary care doctor  Schedule an appointment as soon as possible for a visit in 3 days               Shay Sims PA-C  07/17/24 [id number]

## 2024-07-17 NOTE — DISCHARGE INSTRUCTIONS
You were seen in the emergency department today for rib pain, back pain.  Your x-rays were normal.  Use incentive spirometer as instructed by respiratory therapy.  Please take all medications as prescribed and as we discussed.  Follow-up with specialist if instructed to do so.  It is important to remember that some problems are difficult to diagnose and may not be found during your Emergency Department visit. Be sure to follow up with your primary care doctor and review all labs/imaging/tests that were performed during this visit with them. Some labs/tests may be outside of the normal range and require non-emergent follow-up and further investigation to help diagnose/exclude/prevent complications or other medical conditions. Return to the emergency department for any new or worsening symptoms. Thank you for allowing me to care for you today, it was my pleasure. I hope you get to feeling better soon!

## 2024-07-17 NOTE — Clinical Note
"Elise Prajapati" Berna was seen and treated in our emergency department on 7/17/2024.  She may return to work on 07/20/2024.       If you have any questions or concerns, please don't hesitate to call.      Shay Sims PA-C"

## 2024-07-19 LAB
OHS QRS DURATION: 88 MS
OHS QTC CALCULATION: 440 MS